# Patient Record
Sex: MALE | URBAN - METROPOLITAN AREA
[De-identification: names, ages, dates, MRNs, and addresses within clinical notes are randomized per-mention and may not be internally consistent; named-entity substitution may affect disease eponyms.]

---

## 2018-11-09 ENCOUNTER — APPOINTMENT (RX ONLY)
Dept: URBAN - METROPOLITAN AREA CLINIC 87 | Facility: CLINIC | Age: 58
Setting detail: DERMATOLOGY
End: 2018-11-09

## 2018-11-09 VITALS — DIASTOLIC BLOOD PRESSURE: 85 MMHG | HEART RATE: 60 BPM | SYSTOLIC BLOOD PRESSURE: 120 MMHG

## 2018-11-09 PROBLEM — I10 ESSENTIAL (PRIMARY) HYPERTENSION: Status: ACTIVE | Noted: 2018-11-09

## 2018-11-09 PROBLEM — C44.319 BASAL CELL CARCINOMA OF SKIN OF OTHER PARTS OF FACE: Status: ACTIVE | Noted: 2018-11-09

## 2018-11-09 PROBLEM — L55.1 SUNBURN OF SECOND DEGREE: Status: ACTIVE | Noted: 2018-11-09

## 2018-11-09 PROBLEM — Z85.828 PERSONAL HISTORY OF OTHER MALIGNANT NEOPLASM OF SKIN: Status: ACTIVE | Noted: 2018-11-09

## 2018-11-09 PROBLEM — E78.5 HYPERLIPIDEMIA, UNSPECIFIED: Status: ACTIVE | Noted: 2018-11-09

## 2018-11-09 PROBLEM — L57.0 ACTINIC KERATOSIS: Status: ACTIVE | Noted: 2018-11-09

## 2018-11-09 PROCEDURE — 17311 MOHS 1 STAGE H/N/HF/G: CPT

## 2018-11-09 PROCEDURE — ? REPAIR NOTE

## 2018-11-09 PROCEDURE — 17312 MOHS ADDL STAGE: CPT

## 2018-11-09 PROCEDURE — 14040 TIS TRNFR F/C/C/M/N/A/G/H/F: CPT

## 2018-11-09 PROCEDURE — ? MOHS SURGERY

## 2018-11-09 NOTE — PROCEDURE: MOHS SURGERY
Please arrange prostate biopsy, and a prebiopsy rectal culture. The order is placed.    -----------  I discussed his PSA with him. We discussed the overall stability over the past 4-5 years which would favor an unlikely aggressive prostate cancer. However, the PSA is still fairly significantly elevated for a man his age and, free PSA but some also at a higher risk of having cancer. Ultimately I recommended a biopsy to get more data to better  him. We discussed some of the risks of this and I previously discussed it as well. He felt comfortable with this approach and wished to proceed.   Area L Indication Text: Tumors in this location are included in Area L (trunk and extremities).  Mohs surgery is indicated for larger tumors, or tumors with aggressive histologic features, in these anatomic locations.

## 2018-11-09 NOTE — PROCEDURE: MOHS SURGERY
Body Location Override (Optional - Billing Will Still Be Based On Selected Body Map Location If Applicable): medial chin

## 2018-11-09 NOTE — HPI: PROCEDURE (MOHS)
Has The Growth Been Previously Biopsied?: has been previously biopsied
Body Location Override (Optional): Medial chin

## 2018-11-09 NOTE — PROCEDURE: MOHS SURGERY
Referred To Plastics For Closure Text (Leave Blank If You Do Not Want): After obtaining clear surgical margins the patient was sent to plastics for surgical repair.  The patient understands they will receive post-surgical care and follow-up from Dr. Ayoub.

## 2018-11-09 NOTE — PROCEDURE: MOHS SURGERY
Referred To Mid-Level For Closure Text (Leave Blank If You Do Not Want): After obtaining clear surgical margins the patient was sent to Du Quiros PA-C for surgical repair.  The patient understands they will receive post-surgical care and follow-up from the mid-level provider.

## 2019-01-06 NOTE — PROCEDURE: REPAIR NOTE

## 2019-01-25 NOTE — PROCEDURE: MOHS SURGERY
See below. Bharati/Dacia/Car? Closure 3 Information: This tab is for additional flaps and grafts above and beyond our usual structured repairs.  Please note if you enter information here it will not currently bill and you will need to add the billing information manually.

## 2020-07-12 NOTE — PROCEDURE: REPAIR NOTE
None Referred To Oculoplastics For Closure Text (Leave Blank If You Do Not Want): After obtaining clear surgical margins the patient was sent to oculoplastics for surgical repair.  The patient understands they will receive post-surgical care and follow-up from the referring physician's office.

## 2021-12-26 NOTE — PROCEDURE: REPAIR NOTE
68 M ALS dysphagia with feeding tube, head injury sept 2021, covid s/p mab sept 2021, HTN, stent, HLD BIB son for vomiting the past 3-4 nights. - thin, frail, NAD, abd soft ntnd with g tube in place - IVF, meds, labs, imaging, check tube placement Bi-Rhombic Flap Text: The defect edges were debeveled with a #15 scalpel blade.  Given the location of the defect and the proximity to free margins a bi-rhombic flap was deemed most appropriate.  Using a sterile surgical marker, an appropriate rhombic flap was drawn incorporating the defect. The area thus outlined was incised deep to adipose tissue with a #15 scalpel blade.  The skin margins were undermined to an appropriate distance in all directions utilizing iris scissors.